# Patient Record
Sex: FEMALE | Race: WHITE | NOT HISPANIC OR LATINO
[De-identification: names, ages, dates, MRNs, and addresses within clinical notes are randomized per-mention and may not be internally consistent; named-entity substitution may affect disease eponyms.]

---

## 2020-07-02 ENCOUNTER — TRANSCRIPTION ENCOUNTER (OUTPATIENT)
Age: 55
End: 2020-07-02

## 2020-07-02 ENCOUNTER — EMERGENCY (EMERGENCY)
Facility: HOSPITAL | Age: 55
LOS: 1 days | Discharge: HOME | End: 2020-07-02
Attending: EMERGENCY MEDICINE | Admitting: EMERGENCY MEDICINE
Payer: MEDICAID

## 2020-07-02 VITALS
OXYGEN SATURATION: 97 % | HEART RATE: 76 BPM | WEIGHT: 169.09 LBS | RESPIRATION RATE: 18 BRPM | SYSTOLIC BLOOD PRESSURE: 148 MMHG | DIASTOLIC BLOOD PRESSURE: 93 MMHG | TEMPERATURE: 98 F

## 2020-07-02 PROCEDURE — 99283 EMERGENCY DEPT VISIT LOW MDM: CPT

## 2020-07-02 PROCEDURE — 17250 CHEM CAUT OF GRANLTJ TISSUE: CPT | Mod: LT

## 2020-07-02 PROCEDURE — 99283 EMERGENCY DEPT VISIT LOW MDM: CPT | Mod: 25

## 2020-07-02 NOTE — ED PROVIDER NOTE - NSFOLLOWUPINSTRUCTIONS_ED_ALL_ED_FT
Nosebleed    WHAT YOU NEED TO KNOW:    A nosebleed, or epistaxis, occurs when one or more of the blood vessels in your nose break. You may have dark or bright red blood from one or both nostrils. A nosebleed is most commonly caused by dry air or picking your nose. A direct blow to your nose, irritation from a cold or allergies, or a foreign object can also cause a nosebleed.     DISCHARGE INSTRUCTIONS:    Return to the emergency department if:     Your nasal packing is soaked with blood.      Your nose is still bleeding after 20 minutes, even after you pinch it.       You have a foul-smelling discharge coming out of your nose.      You feel so weak and dizzy that you have trouble standing up.      You have trouble breathing or talking.     Contact your healthcare provider if:     You have a fever and are vomiting.      You have pain in and around your nose that is getting worse even after you take pain medicines.      Your nasal pack is loose.      You have questions or concerns about your condition or care.    First aid:     Sit up and lean forward. This will help prevent you from swallowing blood. Spit blood and saliva into a bowl.       Apply pressure to your nose. Use 2 fingers to pinch your nose shut for 10       Apply ice on the bridge of your nose to decrease swelling and bleeding. Use a cold pack or put crushed ice in a plastic bag. Cover it with a towel to protect your skin.      Pack your nose with a cotton ball, tissue, tampon, or gauze bandage to stop the bleeding.    Medicines:     Medicines applied to a small piece of cotton and placed in your nose. Medicine may also be sprayed in or applied directly to your nose. You may need medicine to prevent an infection. If bleeding is severe, medicine may be injected into a blood vessel in your nose.       Take your medicine as directed. Contact your healthcare provider if you think your medicine is not helping or if you have side effects. Tell him of her if you are allergic to any medicine. Keep a list of the medicines, vitamins, and herbs you take. Include the amounts, and when and why you take them. Bring the list or the pill bottles to follow-up visits. Carry your medicine list with you in case of an emergency.    Prevent another nosebleed:     Keep your nose moist. Put a small amount of petroleum jelly inside your nostrils as needed. Use a saline (saltwater) nasal spray. Do not put anything else inside your nose unless your healthcare provider says it is okay. Do not use oil-based lubricants if you use oxygen therapy. They may be flammable.      Use a cool mist humidifier to increase air moisture in your home. This will help your nose stay moist.       Do not pick or blow your nose for at least a week. You can irritate or damage your nose if you pick it. Blowing your nose too hard may cause the bleeding to start again. Do not bend over or strain as this can cause the bleeding to start again.      Avoid irritants such as tobacco smoke or chemical sprays such as .    Follow up with your healthcare provider as directed: Any packing in your nose should be removed within 2 to 3 days. Write down your questions so you remember to ask them during your visits.        © Copyright [employer] 2020       back to top                      © Copyright [employer] 2020

## 2020-07-02 NOTE — ED PROVIDER NOTE - CLINICAL SUMMARY MEDICAL DECISION MAKING FREE TEXT BOX
pt with epistaxis since resolved. cauterized site. advised ent follow up. All questions answered and concerns addressed. pt verbalized understanding and agreement with plan and dx. pt advised on next step and when/where to follow up. pt advised on all take home and otc medications. pt advised to follow up with PMD. pt advised to return to ed for worsenng symptoms including fever, cp, sob. will dc.

## 2020-07-02 NOTE — ED PROVIDER NOTE - ATTENDING CONTRIBUTION TO CARE
55 year old female presents with one hour left nares epistaxis. No trauma/blood thinners/history of nasal bleed. Pt. on tamoxifen for past three years for "prevention of breast cancer"- had benign lumpectomy three years ago.     Gen: Awake, Alert, WD, WN, NAD  Head:  NC/AT  Eyes:  PERRL, EOMI, Conjunctiva pink, lids normal, no scleral icterus  ENT: minimal bleeding form left nares post pharynx clear  Neck: trachea midline  Respiratory/Pulm:  normal resp effort  Ext:  warm, well perfused, moving all extremities spontaneously  Skin: intact, no rash  Neuro:  AAOx3, motor 5/5 x 4 extremities, normal gait, speech clear    Procedure-cautery by PA    IMP:  Epistaxis    PLAN: Follow up with PMD as needed. Return if any concerns

## 2020-07-02 NOTE — ED PROVIDER NOTE - CARE PROVIDER_API CALL
Damian Romo  OTOLARYNGOLOGY  [address] [place] [address] [address]  [place] [address]  Phone: [phone]  Fax: [phone]  Follow Up Time:

## 2020-07-03 RX ORDER — VENLAFAXINE HCL 75 MG
1 CAPSULE, EXT RELEASE 24 HR ORAL
Qty: 0 | Refills: 0 | DISCHARGE

## 2020-07-03 RX ORDER — TAMOXIFEN CITRATE 20 MG/1
1 TABLET, FILM COATED ORAL
Qty: 0 | Refills: 0 | DISCHARGE

## 2020-07-16 ENCOUNTER — EMERGENCY (EMERGENCY)
Facility: HOSPITAL | Age: 55
LOS: 1 days | Discharge: HOME | End: 2020-07-16
Attending: STUDENT IN AN ORGANIZED HEALTH CARE EDUCATION/TRAINING PROGRAM | Admitting: STUDENT IN AN ORGANIZED HEALTH CARE EDUCATION/TRAINING PROGRAM
Payer: COMMERCIAL

## 2020-07-16 VITALS
SYSTOLIC BLOOD PRESSURE: 143 MMHG | HEART RATE: 92 BPM | DIASTOLIC BLOOD PRESSURE: 78 MMHG | TEMPERATURE: 98 F | OXYGEN SATURATION: 98 % | RESPIRATION RATE: 16 BRPM

## 2020-07-16 VITALS
HEART RATE: 58 BPM | TEMPERATURE: 98 F | RESPIRATION RATE: 16 BRPM | DIASTOLIC BLOOD PRESSURE: 80 MMHG | OXYGEN SATURATION: 100 % | SYSTOLIC BLOOD PRESSURE: 140 MMHG

## 2020-07-16 PROCEDURE — 72125 CT NECK SPINE W/O DYE: CPT | Mod: 26

## 2020-07-16 PROCEDURE — 71046 X-RAY EXAM CHEST 2 VIEWS: CPT | Mod: 26

## 2020-07-16 PROCEDURE — 72083 X-RAY EXAM ENTIRE SPI 4/5 VW: CPT | Mod: 26

## 2020-07-16 PROCEDURE — 99284 EMERGENCY DEPT VISIT MOD MDM: CPT

## 2020-07-16 RX ORDER — IBUPROFEN 200 MG
600 TABLET ORAL ONCE
Refills: 0 | Status: COMPLETED | OUTPATIENT
Start: 2020-07-16 | End: 2020-07-16

## 2020-07-16 RX ORDER — CYCLOBENZAPRINE HYDROCHLORIDE 10 MG/1
1 TABLET, FILM COATED ORAL
Qty: 15 | Refills: 0
Start: 2020-07-16 | End: 2020-07-20

## 2020-07-16 RX ADMIN — Medication 600 MILLIGRAM(S): at 12:36

## 2020-07-16 NOTE — ED PROVIDER NOTE - PMH
<<----- Click to add NO pertinent Past Medical History Hyponatremia    No pertinent past medical history    Osteoporosis  osteopinea
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
## 2020-07-16 NOTE — ED PROVIDER NOTE - NSFOLLOWUPINSTRUCTIONS_ED_ALL_ED_FT
Rest, drink plenty of fluids.  Advance activity as tolerated.  Continue all previously prescribed medications as directed.  Follow up with your primary care physician in 48-72 hours- bring copies of your results.  Return to the ER for worsening or persistent symptoms, and/or ANY NEW OR CONCERNING SYMPTOMS. If you have issues obtaining follow up, please call: [phone] to obtain a doctor or specialist who takes your insurance in your area.  You may call [phone] to make an appointment with the internal medicine clinic.

## 2020-07-16 NOTE — ED PROVIDER NOTE - PATIENT PORTAL LINK FT
You can access the FollowMyHealth Patient Portal offered by [facility] by registering at the following website: [email or web address]. By joining [employer]’s FollowMyHealth portal, you will also be able to view your health information using other applications (apps) compatible with our system.
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
## 2020-07-17 PROBLEM — Z78.9 OTHER SPECIFIED HEALTH STATUS: Chronic | Status: ACTIVE | Noted: 2020-07-02

## 2020-09-21 NOTE — ED PROVIDER NOTE - AGGRAVATING FACTORS
[de-identified] : Patient is a very nice 65 year old female who presents for reevaluation of left more than right knee pain, which is severe in intensity.  I have previously diagnosed with severe bilateral knee osteoarthritis.  She is been working on weight loss and has lost approximately 60 pounds recently.  She knows she needs to lose another 15 pounds to be a candidate for surgery.  She is ready felt tried and failed a course of conservative management including some runs of cortisone injections, Tylenol, NSAIDs.  She also has had several rounds of cortisone injections.  She has tried 1 3 months of physical therapy without relief.  Walking tolerance is limited.  Activities daily living are limited.  Using a cane for ambulation.  The patient denies any radiation of the pain to the feet and it is not associated with numbness, tingling, or weakness.   none